# Patient Record
Sex: MALE | Race: OTHER
[De-identification: names, ages, dates, MRNs, and addresses within clinical notes are randomized per-mention and may not be internally consistent; named-entity substitution may affect disease eponyms.]

---

## 2021-02-19 ENCOUNTER — HOSPITAL ENCOUNTER (EMERGENCY)
Dept: HOSPITAL 15 - ER | Age: 46
Discharge: HOME | End: 2021-02-19
Payer: COMMERCIAL

## 2021-02-19 VITALS — SYSTOLIC BLOOD PRESSURE: 117 MMHG | DIASTOLIC BLOOD PRESSURE: 76 MMHG

## 2021-02-19 VITALS — HEIGHT: 67 IN | WEIGHT: 300 LBS | BODY MASS INDEX: 47.09 KG/M2
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
## 2021-02-19 DIAGNOSIS — F12.10: ICD-10-CM
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
## 2021-02-19 DIAGNOSIS — S93.401A: Primary | ICD-10-CM
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
## 2021-02-19 DIAGNOSIS — J45.909: ICD-10-CM

## 2021-02-19 PROCEDURE — 73610 X-RAY EXAM OF ANKLE: CPT

## 2025-03-25 NOTE — ED.PDOC
History of Present Illness


HPI Comments


49-year-old male with past medical history pertinent for asthma, presents to ED 

for left-sided rib/upper abdominal pain x2 hours ago, associated nausea.  

Patient also states that he had an episode of dark urine today.  He reports that

he has been working out a lot.  Patient states that left rib started hurting 

when he bent over to tie his shoes.  She denies any fever, chills, shortness of 

breath, vomiting, diarrhea.  He currently rates his pain as 8/10 in severity.  

No alleviating or aggravating factors.


Chief Complaint:  Rib Pain


Time Seen by MD:  18:35


Primary Care Provider:  par


Reviewed Notes:  Nurses Notes, Medications, Allergies


Allergies:  


Uncoded Allergies:  


     ADVAIR (Allergy, Unknown, 3/25/25)


Mode of Arrival:  Ambulatory





Past Medical History


PAST MEDICAL HISTORY:  Asthma


Surgical History:  Denies all surgeries





Family History


Family History:  Reviewed,noncontributory to illness





Social History


Smoker:  Non-Smoker


Drugs:  Marijuana


Lives In:  Home





Constitutional:  denies: chills, diaphoresis, fatigue, fever, malaise, sweats, 

weakness, others


EENTM:  denies: blurred vision, double vision, ear bleeding, ear discharge, ear 

drainage, ear pain, ear ringing, eye pain, eye redness, hearing loss, mouth 

pain, mouth swelling, nasal discharge, nose bleeding, nose congestion, nose 

pain, photophobia, tearing, throat pain, throat swelling, voice changes, others


Respiratory:  denies: cough, hemoptysis, orthopnea, SOB at rest, shortness of 

breath, SOB with excertion, stridor, wheezing, others


Cardiovascular:  denies: chest pain, dizzy spells, diaphoresis, Dyspnea on 

exertion, edema, irregular heart beat, left arm pain, lightheadedness, 

palpitations, PND, syncope, others


Gastrointestinal:  reports: nausea; denies: abdomen distended, abdominal pain, 

blood streaked bowels, constipated, diarrhea, dysphagia, difficulty swallowing, 

hematemesis, melena, poor appetite, poor fluid intake, rectal bleeding, rectal 

pain, vomiting, others


Genitourinary:  reports: others (dark urine); denies: burning, dysuria, flank 

pain, frequency, hematuria, incontinence, penile discharge, penile sore, pain, 

testicle pain, testicle swelling, urgency


Neurological:  denies: dizziness, fainting, headache, left sided numbness, left 

sided weakness, numbness, paresthesia, pre-existing deficit, right sided 

numbness, right sided weakness, seizure, speech problems, tingling, tremors, 

weakness, others


Musculoskeletal:  reports: others (left sided rib pain); denies: back pain, g

out, joint pain, joint swelling, muscle pain, muscle stiffness, neck pain


Integumetry:  denies: bruises, change in color, change in hair/nails, dryness, 

laceration, lesions, lumps, rash, wounds, others


Allergic/Immunocompromised:  denies: Difficulty Healing, Frequent Infections, 

Hives, Itching, others


Hematologic/Lymphatic:  denies: anemia, blood clots, easy bleeding, easy 

bruising, swollen glands, others


Endocrine:  denies: excessive hunger, excessive sweating, excessive thirst, 

excessive urination, flushing, intolerance to cold, intolerance to heat, 

unexplained weight gain, unexplained weight loss, others


Psychiatric:  denies: anxiety, bipolar disorder, depression, hopeless, panic 

disorder, schizophrenia, sleepless, suicidal, others


All Other Systems:  Reviewed and Negative





Physical Exam


General Appearance:  No Apparent Distress, Obese


HEENT:  Normal ENT Inspection, Pharynx Normal, TMs Normal


Neck:  Full Range of Motion, Non-Tender, Normal, Normal Inspection


Respiratory:  Chest Non-Tender, Lungs Clear, No Accessory Muscle Use, No 

Respiratory Distress, Normal Breath Sounds


Cardiovascular:  No Edema, No JVD, No Murmur, No Gallop, Normal Peripheral 

Pulses, Regular Rate/Rhythm


Breast Exam:  Deferred


Gastrointestinal:  No Organomegaly, Non Tender, No Pulsatile Mass, Normal Bowel 

Sounds, Soft


Genitalia:  Deferred


Pelvic:  Deferred


Rectal:  Deferred


Extremities:  No calf tenderness, Normal capillary refill, Normal inspection, 

Normal range of motion, Non-tender, No pedal edema


Musculoskeletal :  


   Location:  Left


   Extremity Location:  Other (Mild tenderness to palpation to the left-sided 

ribs/upper abdominal region, with a mild bulge palpated. )


   Apperance:  Normal


Neurologic:  Alert, CNs II-XII nml as Tested, No Motor Deficits, Normal Affect, 

Normal Mood, No Sensory Deficits


Cerebellar Function:  Normal


Reflexes:  Normal


Skin:  Dry, Normal Color, Warm


Lymphatic:  No Adenopathy





Was a procedure done?


Was a procedure done?:  No





Differential Dx


Considerations may include:


Hernia, rib strain, rib fracture





X-Ray, Labs, Meds, VS





                                   Vital Signs








  Date Time  Temp Pulse Resp B/P (MAP) Pulse Ox O2 Delivery O2 Flow Rate FiO2


 


3/25/25 18:29 98.4 107 17 163/97 (119) 97   





 98.4       








                                       Lab








Test


 3/25/25


19:35 3/25/25


19:00 Range/Units


 


 


White Blood Count


 9.9 


 


 4.4-10.8


10^3/uL


 


Red Blood Count


 4.74 


 


 4.5-5.90


10^6/uL


 


Hemoglobin 14.3   13.5-17.5  g/dL


 


Hematocrit 42.6   41.0-53.0  %


 


Mean Corpuscular Volume 90.0   80.0-100.0  fL


 


Mean Corpuscular Hemoglobin 30.2   28.0-32.0  pg


 


Mean Corpuscular Hemoglobin


Concent 33.6 


 


 32.0-36.0  g/dL





 


Red Cell Distribution Width 14.0   11.8-14.3  %


 


Platelet Count


 246 


 


 140-450


10^3/uL


 


Mean Platelet Volume 7.9   6.9-10.8  fL


 


Neutrophils (%) (Auto) 62.5   37.0-80.0  %


 


Lymphocytes (%) (Auto) 29.4   10.0-50.0  %


 


Monocytes (%) (Auto) 7.2   0.0-12.0  %


 


Eosinophils (%) (Auto) 0.8   0.0-7.0  %


 


Basophils (%) (Auto) 0.1   0.0-2.0  %


 


Neutrophils # (Auto)


 6.2 


 


 1.6-8.6  10


^3/uL


 


Lymphocytes # (Auto)


 2.9 


 


 0.4-5.4  10


^3/uL


 


Monocytes # (Auto) 0.7   0-1.3  10 ^3/uL


 


Eosinophils # (Auto) 0.1   0-0.8  10 ^3/uL


 


Basophils # (Auto) 0   0-0.2  10 ^3/uL


 


Nucleated Red Blood Cells 0.1    %


 


Sodium Level 139   136-145  mmol/L


 


Potassium Level 4.3   3.5-5.1  mmol/L


 


Chloride Level 106     mmol/L


 


Carbon Dioxide Level 26   20-31  mmol/L


 


Anion Gap 7   5-15  


 


Blood Urea Nitrogen 15   9-23  mg/dL


 


Creatinine


 1.21 


 


 0.700-1.30


mg/dL


 


Glomerular Filtration Rate


Calc 74 


 


 >90  mL/min





 


BUN/Creatinine Ratio 12.4   10.0-20.0  


 


Serum Glucose 97     mg/dL


 


Lactic Acid Level 1.7   0.4-2.0  mmol/L


 


Calcium Level 9.6   8.7-10.4  mg/dL


 


Total Bilirubin 1.2 H  0.2-1.0  mg/dL


 


Aspartate Amino Transferase


(AST) 61 H


 


 13-40  U/L





 


Alanine Aminotransferase (ALT) 34   7-40  U/L


 


Alkaline Phosphatase 62     U/L


 


Creatine Kinase 1175 H    U/L


 


Total Protein 7.8   5.7-8.2  g/dL


 


Albumin 4.3   3.2-4.8  g/dL


 


Urine Color  Yellow  Yellow  


 


Urine Clarity  Clear  Clear  


 


Urine pH  6.0  5.0-9.0  


 


Urine Specific Gravity  1.021  1.001-1.035  


 


Urine Protein  Negative  Negative  


 


Urine Ketones  Trace  Negative  


 


Urine Blood  Negative  Negative  /uL


 


Urine Nitrite  Negative  Negative  


 


Urine Bilirubin  Negative  Negative  


 


Urine Urobilinogen  Normal  Negative  mg/dL


 


Urine Leukocyte Esterase  Negative  Negative  /uL


 


Urine RBC  <1  0 - 3  /hpf


 


Urine Microscopic WBC  < 1  0-3  /HPF


 


Urine Squamous Epithelial


Cells 


 Few 


 <5  /hpf





 


Urine Bacteria  None seen  None Seen  /hpf


 


Urine Glucose  Normal  Normal  mg/dL








                               Current Medications








 Medications


  (Trade)  Dose


 Ordered  Sig/Sebastian


 Route  Start Time


 Stop Time Status Last Admin


 


 Ketorolac


 Tromethamine


  (Toradol


 Injection)  30 mg  ONCE  ONCE


 IM  3/25/25 19:15


 3/25/25 19:16 DC 3/25/25 19:41











X-Ray, Labs, Meds, VS Comment





Ribs XR Impression: 





1. No evidence of an acute rib fracture.





2. No acute cardiopulmonary disease.











CT Abd/Pelv FINDINGS: 





Lower Chest: Unremarkable.





Hepatobiliary: Hepatomegaly and hepatic steatosis.  





Spleen: Unremarkable. 





Pancreas: Unremarkable. 





Adrenal Glands: Unremarkable. 





 tract: The kidneys are normal in size bilaterally without hydronephrosis or 

nephrolithiasis. The urinary bladder is unremarkable. 





GI tract: The stomach is grossly normal in appearance. No evidence of small 

bowel obstruction. The large bowel is unremarkable. The appendix is not 

visualized. No inflammatory change is noted in the right lower quadrant. 





Lymphatics: No mesenteric, retroperitoneal or periportal lymphadenopathy.  





Vasculature: The abdominal aorta is normal in in caliber. 





Pelvic Organs: Unremarkable 





Bones/soft tissues: No acute abnormality.  Degenerative disc disease and 

posterior facet arthropathy at L5-S1. Tiny fat containing umbilical hernia with 

a subcentimeter neck. Fat containing right inguinal canal that may represent in-

situ fat and/or fat containing hernia. Right inguinal internal ring measures 1.3

cm in transverse. No definite defect noted at the internal ring 





Other: None. 





IMPRESSION: 





1. No CT evidence for acute intra-abdominal or intrapelvic process. No large 

ventral abdominal wall hernia.














MDM:


Patient with history as above presented with rib pain. History obtained from 

patient. Patient was nontoxic, stable, afebrile, ambulatory, no acute distress. 

Exam as above. Labs reviewed. CBC was unremarkable.  No leukocytosis.  No 

anemia.  CMP did not show significant electrolyte abnormalities.  CK elevated at

1175.  Urinalysis was negative for acute infection.  No blood in the urine. 

Independently reviewed imaging.  Left ribs x-ray did not show any acute 

fracture.  CT abdomen/pelvis did not show a ventral wall hernia. Reviewed 

external records. All findings were discussed with the patient.


Differential diagnosis considered. Overall presentation is consistent with rib 

strain. Low suspicion for acute fracture, pneumothorax, hernia.


Patient was treated with Toradol with improvement in symptoms. 





Although CK levels were elevated, this may be inside attempted due to patient 

working out a lot recently.  No severe rhabdomyolysis.  Patient also able to 

urinate as well as hydrating normally, therefore does not require admission at 

this time for IV fluids.





Patient was reevaluated and vital signs were reviewed. Consideration was given 

for admission, but the patient was stable for outpatient management.








Disposition: Discussed the need to follow up diagnostics, including incidental 

findings. Discharged the patient with instructions to obtain outpatient follow 

up in 1-2 days of today's symptoms and findings, with strict return precautions 

if patient develops new or worsening symptoms.











This medical document was created using the [employer] dictation system.

Although this document has been carefully reviewed, there may still be some 

phonetic and typographical errors, which are due to imperfections of the 

software program, and do not reflect any compromise in the patient's medical 

care.


Time of 1ST Reevaluation:  22:15


Reevaluation 1ST:  Improved


Patient Education/Counseling:  Diagnosis, Treatment, Prognosis, Need For Follow 

Up


Family Education/Counseling:  No Family Present





Departure 1


Departure


Time of Disposition:  22:15


Impression:  


   Primary Impression:  


   Rib pain


Disposition:  01 HOME / SELF CARE / HOMELESS


Condition:  Fair


e-Prescriptions


Ibuprofen Micronized (Ibuprofen) 800 Mg Tab


800 MG PO Q8HPRN PRN, #30 TAB


   Prov: DASH SILVA         3/25/25





Critical Care Note


Critical Care Time?:  No





Stability


Stability form required:  No





Heart Score


Heart Score:  








Heart Score Response (Comments) Value


 


History N/A 0


 


EKG N/A 0


 


Age N/A 0


 


Risk Factors N/A 0


 


Troponin N/A 0


 


Total  0

















DASH SILVA PAC               Mar 25, 2025 19:49

## 2025-03-25 NOTE — [facility]
Procedure: CT CT AB PEL WO CON-NO ORAL OR IV 03/25/2025 07:21 PM 



Indication: R/o hernia



Comparison Study:  None 



Technique: Axial images were obtained and reformatted in coronal and sagittal planes. All CT scans at
 this medical facility are performed using dose modulation techniques as appropriate to a performed e
xam including the following: Automated exposure control was utilized; adjustment of the MA and/or KV 
according to patient size; and use of iterative reconstruction technique. CT Dose: CTDI volume is 27.
88 mGy. Dose-length product is 1490.98 mGy*cm 



FINDINGS: 



Lower Chest: Unremarkable.



Hepatobiliary: Hepatomegaly and hepatic steatosis.  



Spleen: Unremarkable. 



Pancreas: Unremarkable. 



Adrenal Glands: Unremarkable. 



 tract: The kidneys are normal in size bilaterally without hydronephrosis or nephrolithiasis. The u
rinary bladder is unremarkable. 



GI tract: The stomach is grossly normal in appearance. No evidence of small bowel obstruction. The la
rge bowel is unremarkable. The appendix is not visualized. No inflammatory change is noted in the rig
ht lower quadrant. 



Lymphatics: No mesenteric, retroperitoneal or periportal lymphadenopathy.  



Vasculature: The abdominal aorta is normal in in caliber. 



Pelvic Organs: Unremarkable 



Bones/soft tissues: No acute abnormality.  Degenerative disc disease and posterior facet arthropathy 
at L5-S1. Tiny fat containing umbilical hernia with a subcentimeter neck. Fat containing right inguin
al canal that may represent in-situ fat and/or fat containing hernia. Right inguinal internal ring me
asures 1.3 cm in transverse. No definite defect noted at the internal ring 



Other: None. 



IMPRESSION: 



1. No CT evidence for acute intra-abdominal or intrapelvic process. No large ventral abdominal wall h
ernia.



Electronically Signed by: Nimco Varner at 03/25/2025 20:06:46 PM

## 2025-03-25 NOTE — [facility]
FRONTAL CHEST AND LEFT RIB RADIOGRAPHS



HISTORY: R/o rib fracture



TECHNIQUE: Multiple views of the  left ribs with frontal view of the chest.



COMPARISON: None.



FINDINGS: 



The trachea is midline. The cardiac silhouette and mediastinum are within normal limits.



No pneumothorax, pleural effusions, or consolidations.



There is no evidence of an acute fracture, dislocation, blastic, or lytic lesions. Chronic right rib 
fractures.



No radiopaque foreign bodies.



No superficial soft tissue abnormalities.



Impression: 



1. No evidence of an acute rib fracture.



 



2. No acute cardiopulmonary disease.



Electronically Signed by: Namrata Ohara at 03/25/2025 19:14:13 PM